# Patient Record
Sex: FEMALE
[De-identification: names, ages, dates, MRNs, and addresses within clinical notes are randomized per-mention and may not be internally consistent; named-entity substitution may affect disease eponyms.]

---

## 2020-06-11 ENCOUNTER — DASHBOARD ENCOUNTERS (OUTPATIENT)
Age: 55
End: 2020-06-11

## 2020-06-11 ENCOUNTER — OFFICE VISIT (OUTPATIENT)
Dept: URBAN - METROPOLITAN AREA TELEHEALTH 2 | Facility: TELEHEALTH | Age: 55
End: 2020-06-11
Payer: COMMERCIAL

## 2020-06-11 DIAGNOSIS — K21.9 GERD: ICD-10-CM

## 2020-06-11 DIAGNOSIS — K31.84 GASTROPARESIS: ICD-10-CM

## 2020-06-11 DIAGNOSIS — Z12.11 COLON CANCER SCREENING: ICD-10-CM

## 2020-06-11 PROBLEM — 275978004 COLON CANCER SCREENING: Status: ACTIVE | Noted: 2020-06-11

## 2020-06-11 PROBLEM — 235675006 GASTROPARESIS: Status: ACTIVE | Noted: 2020-06-11

## 2020-06-11 PROBLEM — 235595009 GASTROESOPHAGEAL REFLUX DISEASE: Status: ACTIVE | Noted: 2020-06-11

## 2020-06-11 PROCEDURE — 99203 OFFICE O/P NEW LOW 30 MIN: CPT | Performed by: INTERNAL MEDICINE

## 2020-06-11 PROCEDURE — 3017F COLORECTAL CA SCREEN DOC REV: CPT | Performed by: INTERNAL MEDICINE

## 2020-06-11 PROCEDURE — G9903 PT SCRN TBCO ID AS NON USER: HCPCS | Performed by: INTERNAL MEDICINE

## 2020-06-11 PROCEDURE — G8417 CALC BMI ABV UP PARAM F/U: HCPCS | Performed by: INTERNAL MEDICINE

## 2020-06-11 RX ORDER — POLYETHYLENE GLYCOL 3350, SODIUM SULFATE, SODIUM CHLORIDE, POTASSIUM CHLORIDE, ASCORBIC ACID, SODIUM ASCORBATE 140-9-5.2G
AS DIRECTED KIT ORAL
Qty: 1 BOX | Refills: 0 | OUTPATIENT
Start: 2020-06-11 | End: 2020-06-12

## 2020-06-11 RX ORDER — METOCLOPRAMIDE HYDROCHLORIDE 10 MG/1
1 TABLET BEFORE MEALS TABLET ORAL
Refills: 3 | OUTPATIENT

## 2020-06-11 NOTE — HPI-TODAY'S VISIT:
Pt reports that she has gastroparesis and GERD. States that gastroparesis is bad. Has been taking Reglan for almost 20 years.  Pt reports that she is aware of the potential side effects of the medications.  Pt reports that she has had fatigue.  Pt has not had tremors.

## 2020-06-19 ENCOUNTER — TELEPHONE ENCOUNTER (OUTPATIENT)
Dept: URBAN - METROPOLITAN AREA CLINIC 54 | Facility: CLINIC | Age: 55
End: 2020-06-19

## 2021-05-13 ENCOUNTER — P2P PATIENT RECORD (OUTPATIENT)
Age: 56
End: 2021-05-13